# Patient Record
Sex: FEMALE | Race: WHITE | NOT HISPANIC OR LATINO | URBAN - METROPOLITAN AREA
[De-identification: names, ages, dates, MRNs, and addresses within clinical notes are randomized per-mention and may not be internally consistent; named-entity substitution may affect disease eponyms.]

---

## 2020-11-09 ENCOUNTER — TELEHEALTH PROVIDED OTHER THAN IN PATIENT'S HOME (OUTPATIENT)
Dept: URBAN - METROPOLITAN AREA TELEHEALTH 12 | Facility: TELEHEALTH | Age: 55
End: 2020-11-09

## 2020-11-09 VITALS — HEIGHT: 63 IN | WEIGHT: 293 LBS

## 2020-11-09 DIAGNOSIS — K21.00 GASTRO-ESOPHAGEAL REFLUX DISEASE WITH ESOPHAGITIS, WITHOUT B: ICD-10-CM

## 2020-11-09 DIAGNOSIS — E66.01 MORBID (SEVERE) OBESITY DUE TO EXCESS CALORIES: ICD-10-CM

## 2020-11-09 DIAGNOSIS — R10.13 EPIGASTRIC PAIN: ICD-10-CM

## 2020-11-09 DIAGNOSIS — R10.11 RIGHT UPPER QUADRANT PAIN: ICD-10-CM

## 2020-11-09 PROCEDURE — 99244 OFF/OP CNSLTJ NEW/EST MOD 40: CPT | Mod: 95 | Performed by: INTERNAL MEDICINE

## 2020-11-09 RX ORDER — OMEPRAZOLE 40 MG/1
CAPSULE, DELAYED RELEASE ORAL
Qty: 0 | Refills: 0 | Status: COMPLETED
End: 2023-09-19

## 2020-11-09 NOTE — SERVICEHPINOTES
PATIENT VERIFIED BY DATE OF BIRTH AND NAME. Patient has been consented for this telecommunication visit. Ms. Ayala is a 53 yo WF w/ PMhx morbid obesity (BMI of 53), CAD (previous stenting), bladder cancer (was on chemo), here for abdominal pain.  She was previously seen at [facility] last July for renal failure due to obstructive uropathy, s/p stenting performed. She eventually had a ureteral stent in left side, and this was corrected by Urology.  She makes her own urine now and she is about to start immunotherapy with Dr. Urbina (last chemo and XRT was few months ago).  Over the past month, she's been having post-prandial (within an hour of eating) pain.  This is sharp, burning pain located in RUQ and epigastrium regions, associated with some nausea.  She has daily indigestion, also having right sided abdominal pain.  She was taking Lansoprazole 15 mg QD for "years."  She was taking this for years, but stopped this a month.  She denies any abuse of ETOH products.  No rectal bleeding.

## 2020-11-16 ENCOUNTER — INPATIENT HOSPITAL (OUTPATIENT)
Dept: URBAN - METROPOLITAN AREA HOSPITAL 34 | Facility: HOSPITAL | Age: 55
End: 2020-11-16

## 2020-11-16 DIAGNOSIS — K21.00 GASTRO-ESOPHAGEAL REFLUX DISEASE WITH ESOPHAGITIS, WITHOUT B: ICD-10-CM

## 2020-11-16 DIAGNOSIS — E66.01 MORBID (SEVERE) OBESITY DUE TO EXCESS CALORIES: ICD-10-CM

## 2020-11-16 DIAGNOSIS — R10.13 EPIGASTRIC PAIN: ICD-10-CM

## 2020-11-16 PROCEDURE — 99254 IP/OBS CNSLTJ NEW/EST MOD 60: CPT | Performed by: INTERNAL MEDICINE

## 2020-11-17 PROCEDURE — 43235 EGD DIAGNOSTIC BRUSH WASH: CPT | Performed by: INTERNAL MEDICINE

## 2020-11-18 ENCOUNTER — INPATIENT HOSPITAL (OUTPATIENT)
Dept: URBAN - METROPOLITAN AREA HOSPITAL 34 | Facility: HOSPITAL | Age: 55
End: 2020-11-18

## 2020-11-18 DIAGNOSIS — K21.00 GASTRO-ESOPHAGEAL REFLUX DISEASE WITH ESOPHAGITIS, WITHOUT B: ICD-10-CM

## 2020-11-18 DIAGNOSIS — R10.13 EPIGASTRIC PAIN: ICD-10-CM

## 2020-11-18 DIAGNOSIS — E66.01 MORBID (SEVERE) OBESITY DUE TO EXCESS CALORIES: ICD-10-CM

## 2020-11-18 PROCEDURE — 99232 SBSQ HOSP IP/OBS MODERATE 35: CPT | Performed by: NURSE PRACTITIONER

## 2021-01-11 ENCOUNTER — TELEHEALTH PROVIDED OTHER THAN IN PATIENT'S HOME (OUTPATIENT)
Dept: URBAN - METROPOLITAN AREA TELEHEALTH 3 | Facility: TELEHEALTH | Age: 56
End: 2021-01-11

## 2021-01-11 VITALS — WEIGHT: 293 LBS | HEIGHT: 63 IN

## 2021-01-11 DIAGNOSIS — K42.9 UMBILICAL HERNIA WITHOUT OBSTRUCTION OR GANGRENE: ICD-10-CM

## 2021-01-11 DIAGNOSIS — R10.13 EPIGASTRIC PAIN: ICD-10-CM

## 2021-01-11 DIAGNOSIS — R10.11 RIGHT UPPER QUADRANT PAIN: ICD-10-CM

## 2021-01-11 DIAGNOSIS — K80.20 CALCULUS OF GALLBLADDER WITHOUT CHOLECYSTITIS WITHOUT OBSTRU: ICD-10-CM

## 2021-01-11 PROCEDURE — 99214 OFFICE O/P EST MOD 30 MIN: CPT | Mod: 95 | Performed by: INTERNAL MEDICINE

## 2021-01-11 NOTE — SERVICEHPINOTES
PATIENT VERIFIED BY DATE OF BIRTH AND NAME. Patient has been consented for this telecommunication visit. Ms. Ayala is a 55 yo WF w/ PMhx morbid obesity (BMI of 53), CAD (previous stenting), bladder cancer (was on chemo), here for abdominal pain. She was previously seen at [facility] last July for renal failure due to obstructive uropathy, s/p stenting performed. She eventually had a ureteral stent in left side, and this was corrected by Urology. She makes her own urine now and she is about to start immunotherapy with Dr. Urbina (last chemo and XRT was few months ago). Over the past month, she's been having post-prandial (within an hour of eating) pain. This is sharp, burning pain located in RUQ and epigastrium regions, associated with some nausea.  She was taking this for years, but stopped this a month. She denies any abuse of ETOH products. No rectal bleeding. She went to the ER for the abdominal pain for "sepsis" due to bacteremia of urinary origin.  Was given IV antibiotics, and feeling much better overall.  Within an hour after eating, she feels a sharp pain "behind the umbilical hernia."  She is taking Omeprazole 40 mg daily which his completely managing her GERD. She also underwent EGD on 11/17/2020 by Dr. Guthrie and with overall unremarkable findings.   She continues to have RUQ pain and discomfort at the hernia site. All 10 point review of systems have been reviewed as per HPI and otherwise negative.

## 2023-05-09 ENCOUNTER — OFFICE (OUTPATIENT)
Dept: URBAN - METROPOLITAN AREA CLINIC 79 | Facility: CLINIC | Age: 58
End: 2023-05-09
Payer: MEDICARE

## 2023-05-09 VITALS
SYSTOLIC BLOOD PRESSURE: 134 MMHG | TEMPERATURE: 97.6 F | DIASTOLIC BLOOD PRESSURE: 81 MMHG | WEIGHT: 238 LBS | HEIGHT: 63 IN | HEART RATE: 95 BPM

## 2023-05-09 DIAGNOSIS — R14.0 ABDOMINAL DISTENSION (GASEOUS): ICD-10-CM

## 2023-05-09 DIAGNOSIS — C67.9 MALIGNANT NEOPLASM OF BLADDER, UNSPECIFIED: ICD-10-CM

## 2023-05-09 DIAGNOSIS — F17.200 NICOTINE DEPENDENCE, UNSPECIFIED, UNCOMPLICATED: ICD-10-CM

## 2023-05-09 DIAGNOSIS — R10.30 LOWER ABDOMINAL PAIN, UNSPECIFIED: ICD-10-CM

## 2023-05-09 DIAGNOSIS — R19.4 CHANGE IN BOWEL HABIT: ICD-10-CM

## 2023-05-09 DIAGNOSIS — N28.9 DISORDER OF KIDNEY AND URETER, UNSPECIFIED: ICD-10-CM

## 2023-05-09 DIAGNOSIS — K59.09 OTHER CONSTIPATION: ICD-10-CM

## 2023-05-09 PROCEDURE — 99214 OFFICE O/P EST MOD 30 MIN: CPT | Performed by: PHYSICIAN ASSISTANT

## 2023-05-09 RX ORDER — FAMOTIDINE 40 MG/1
TABLET, FILM COATED ORAL
Qty: 180 | Refills: 3 | Status: COMPLETED
Start: 2023-05-09 | End: 2023-09-19

## 2023-05-09 RX ORDER — SIMETHICONE 180 MG
CAPSULE ORAL
Qty: 120 | Refills: 1 | Status: ACTIVE
Start: 2023-05-09

## 2023-05-09 NOTE — SERVICEHPINOTES
DENILSON SANCHEZ   is a   58 yo white female with h/o bladder CA, CKD, DMT, HLD, GERD who complains of change in BMs x 3-4 months ago: No change in diet or medications. She informs of chronic constipation given BMs qod and stool consistency "ribbon like" stools with bloating/gas and lower abdominal pains. She informs of 02/2023 CT scan abdomen/pelvis for this concern that noted no bowel obstruction, no appendicitis, and known h/o Rt sided hydronephrosis w/ stable hydroureter (scan records). She is taking Miralax 1 scoop x 2-3x/week that seems to help stimulate a BM, but ongoing sensation of incomplete evacuation. No prior colonoscopy. She is followed by [facility] in [place], [place] due to h/o bladder CA dx in 2016 s/p radiation. Next visit oncologist on 05/11/2023 for PET scan given concern for recurrent bladder cancer, pending staging of malignancy. She has long h/o GERD that was previously managed on Omeprazole 40 mg qd which was stopped due to CKD. She is asking for alternative to PPI for GERD management. Denies chest pain, n/v, focal abdominal pain, diarrhea, melena, weight loss. No other complaint. br

## 2023-08-04 ENCOUNTER — ON CAMPUS - OUTPATIENT (OUTPATIENT)
Dept: URBAN - METROPOLITAN AREA HOSPITAL 65 | Facility: HOSPITAL | Age: 58
End: 2023-08-04
Payer: MEDICARE

## 2023-08-04 DIAGNOSIS — D12.5 BENIGN NEOPLASM OF SIGMOID COLON: ICD-10-CM

## 2023-08-04 DIAGNOSIS — K59.09 OTHER CONSTIPATION: ICD-10-CM

## 2023-08-04 DIAGNOSIS — R10.30 LOWER ABDOMINAL PAIN, UNSPECIFIED: ICD-10-CM

## 2023-08-04 DIAGNOSIS — R19.4 CHANGE IN BOWEL HABIT: ICD-10-CM

## 2023-08-04 PROCEDURE — 45380 COLONOSCOPY AND BIOPSY: CPT | Performed by: INTERNAL MEDICINE

## 2023-09-19 ENCOUNTER — TELEHEALTH PROVIDED OTHER THAN IN PATIENT'S HOME (OUTPATIENT)
Dept: URBAN - METROPOLITAN AREA TELEHEALTH 3 | Facility: TELEHEALTH | Age: 58
End: 2023-09-19
Payer: MEDICARE

## 2023-09-19 VITALS — HEIGHT: 63 IN | WEIGHT: 225 LBS

## 2023-09-19 DIAGNOSIS — K59.09 OTHER CONSTIPATION: ICD-10-CM

## 2023-09-19 DIAGNOSIS — R10.13 EPIGASTRIC PAIN: ICD-10-CM

## 2023-09-19 DIAGNOSIS — K21.00 GASTRO-ESOPHAGEAL REFLUX DISEASE WITH ESOPHAGITIS, WITHOUT B: ICD-10-CM

## 2023-09-19 PROCEDURE — 99214 OFFICE O/P EST MOD 30 MIN: CPT | Mod: 95 | Performed by: INTERNAL MEDICINE

## 2023-09-19 RX ORDER — PANTOPRAZOLE 40 MG/1
TABLET, DELAYED RELEASE ORAL
Qty: 30 | Refills: 1 | Status: ACTIVE

## 2023-09-19 NOTE — SERVICENOTES
Patient's visit was conducted through moziy video telecommunication. Patient consented before the start of visit as to understanding of privacy concerns, possible technological failure, and their responsibility of carrying out instructions of plan.

## 2023-09-19 NOTE — SERVICEHPINOTES
PATIENT VERIFIED BY DATE OF BIRTH AND NAME. Patient has been consented for this telecommunication visit.
raquel
br57 yo white female with h/o bladder CA, CKD, DMT, HLD, GERD here for f/u.  See clinical summary below:
br
Candelariohe had change in BMs x 3-4 months ago: No change in diet or medications. She informs of chronic constipation given BMs qod and stool consistency "ribbon like" stools with bloating/gas and lower abdominal pains. She informs of 02/2023 CT scan abdomen/pelvis for this concern that noted no bowel obstruction, no appendicitis, and known h/o Rt sided hydronephrosis w/ stable hydroureter (scan records). She is taking Miralax 1 scoop x 2-3x/week that seems to help stimulate a BM, but ongoing sensation of incomplete evacuation.  
raquel garcía She [place]. [place] [facility] [place] the [place] but she wants the bladder cancer to be treated first - she is starting chemo at [facility] for this next week.  
raquel garcía Regarding her constipation - if br
[place] 10 point review of systems have been reviewed as per HPI and otherwise negative. span id="{[id number]}" class="narrative freetextSelected" type="freetext" canedit="true" suppressed="false" nid="[id number]" gid="{[id number]}" bound="false" visited="true"  
br
br /spanspan id="{[id number]}" class="narrative placeholderNormal" type="placeholder" canedit="true" suppressed="false" nid="[id number]" gid="{[id number]}" propertyname="rosWording" displayname="ROS Wording" tooltip="" handler="" handlerdata="" datatype="text" mandatory="false" requires="" allowmultipleentries="" describes="" tagname="" prototype="" dontshowempty="false" empty="true" onmouseover="__NarrativeOnMouseOver('{[id number]}')" onmouseout="__NarrativeOnMouseOut('{[id number]}')" onmousedown="__NarrativePlaceholderClicked('{[id number]}')"[ROS Wording]/spanspan id="{[id number]}" class="narrative freetextNormal" type="freetext" canedit="true" suppressed="false" nid="[id number]" gid="{[id number]}" bound="false" /span